# Patient Record
Sex: FEMALE | Race: ASIAN | NOT HISPANIC OR LATINO
[De-identification: names, ages, dates, MRNs, and addresses within clinical notes are randomized per-mention and may not be internally consistent; named-entity substitution may affect disease eponyms.]

---

## 2020-08-19 ENCOUNTER — TRANSCRIPTION ENCOUNTER (OUTPATIENT)
Age: 30
End: 2020-08-19

## 2020-08-19 ENCOUNTER — ASOB RESULT (OUTPATIENT)
Age: 30
End: 2020-08-19

## 2020-08-19 ENCOUNTER — APPOINTMENT (OUTPATIENT)
Dept: ANTEPARTUM | Facility: CLINIC | Age: 30
End: 2020-08-19
Payer: COMMERCIAL

## 2020-08-19 PROBLEM — Z00.00 ENCOUNTER FOR PREVENTIVE HEALTH EXAMINATION: Status: ACTIVE | Noted: 2020-08-19

## 2020-08-19 PROCEDURE — 76801 OB US < 14 WKS SINGLE FETUS: CPT

## 2020-08-19 PROCEDURE — 76813 OB US NUCHAL MEAS 1 GEST: CPT

## 2020-08-19 PROCEDURE — 36416 COLLJ CAPILLARY BLOOD SPEC: CPT

## 2020-10-14 ENCOUNTER — APPOINTMENT (OUTPATIENT)
Dept: ANTEPARTUM | Facility: CLINIC | Age: 30
End: 2020-10-14
Payer: COMMERCIAL

## 2020-10-14 ENCOUNTER — ASOB RESULT (OUTPATIENT)
Age: 30
End: 2020-10-14

## 2020-10-14 PROCEDURE — 76811 OB US DETAILED SNGL FETUS: CPT

## 2020-10-19 ENCOUNTER — APPOINTMENT (OUTPATIENT)
Dept: ANTEPARTUM | Facility: CLINIC | Age: 30
End: 2020-10-19

## 2021-01-27 ENCOUNTER — APPOINTMENT (OUTPATIENT)
Dept: ANTEPARTUM | Facility: CLINIC | Age: 31
End: 2021-01-27
Payer: COMMERCIAL

## 2021-01-27 ENCOUNTER — ASOB RESULT (OUTPATIENT)
Age: 31
End: 2021-01-27

## 2021-01-27 PROCEDURE — 99072 ADDL SUPL MATRL&STAF TM PHE: CPT

## 2021-01-27 PROCEDURE — 76816 OB US FOLLOW-UP PER FETUS: CPT

## 2021-01-27 PROCEDURE — 76819 FETAL BIOPHYS PROFIL W/O NST: CPT

## 2021-02-19 ENCOUNTER — APPOINTMENT (OUTPATIENT)
Dept: ANTEPARTUM | Facility: CLINIC | Age: 31
End: 2021-02-19
Payer: COMMERCIAL

## 2021-02-19 ENCOUNTER — ASOB RESULT (OUTPATIENT)
Age: 31
End: 2021-02-19

## 2021-02-19 PROCEDURE — 99072 ADDL SUPL MATRL&STAF TM PHE: CPT

## 2021-02-19 PROCEDURE — 76816 OB US FOLLOW-UP PER FETUS: CPT

## 2021-03-02 ENCOUNTER — ASOB RESULT (OUTPATIENT)
Age: 31
End: 2021-03-02

## 2021-03-02 ENCOUNTER — APPOINTMENT (OUTPATIENT)
Dept: ANTEPARTUM | Facility: CLINIC | Age: 31
End: 2021-03-02
Payer: COMMERCIAL

## 2021-03-02 PROCEDURE — 99072 ADDL SUPL MATRL&STAF TM PHE: CPT

## 2021-03-02 PROCEDURE — 76819 FETAL BIOPHYS PROFIL W/O NST: CPT

## 2021-03-04 ENCOUNTER — OUTPATIENT (OUTPATIENT)
Dept: OUTPATIENT SERVICES | Facility: HOSPITAL | Age: 31
LOS: 1 days | End: 2021-03-04
Payer: COMMERCIAL

## 2021-03-04 DIAGNOSIS — Z3A.00 WEEKS OF GESTATION OF PREGNANCY NOT SPECIFIED: ICD-10-CM

## 2021-03-04 DIAGNOSIS — O26.899 OTHER SPECIFIED PREGNANCY RELATED CONDITIONS, UNSPECIFIED TRIMESTER: ICD-10-CM

## 2021-03-04 PROCEDURE — 99214 OFFICE O/P EST MOD 30 MIN: CPT | Mod: 25

## 2021-03-04 PROCEDURE — 76818 FETAL BIOPHYS PROFILE W/NST: CPT | Mod: 26

## 2021-03-04 PROCEDURE — 59025 FETAL NON-STRESS TEST: CPT | Mod: 26

## 2021-03-05 ENCOUNTER — INPATIENT (INPATIENT)
Facility: HOSPITAL | Age: 31
LOS: 1 days | Discharge: HOME | End: 2021-03-07
Attending: STUDENT IN AN ORGANIZED HEALTH CARE EDUCATION/TRAINING PROGRAM | Admitting: STUDENT IN AN ORGANIZED HEALTH CARE EDUCATION/TRAINING PROGRAM
Payer: COMMERCIAL

## 2021-03-05 VITALS
OXYGEN SATURATION: 99 % | SYSTOLIC BLOOD PRESSURE: 124 MMHG | TEMPERATURE: 98 F | HEART RATE: 99 BPM | DIASTOLIC BLOOD PRESSURE: 82 MMHG | RESPIRATION RATE: 18 BRPM

## 2021-03-05 VITALS — SYSTOLIC BLOOD PRESSURE: 124 MMHG | DIASTOLIC BLOOD PRESSURE: 82 MMHG

## 2021-03-05 DIAGNOSIS — O26.899 OTHER SPECIFIED PREGNANCY RELATED CONDITIONS, UNSPECIFIED TRIMESTER: ICD-10-CM

## 2021-03-05 DIAGNOSIS — Z34.80 ENCOUNTER FOR SUPERVISION OF OTHER NORMAL PREGNANCY, UNSPECIFIED TRIMESTER: ICD-10-CM

## 2021-03-05 DIAGNOSIS — Z3A.00 WEEKS OF GESTATION OF PREGNANCY NOT SPECIFIED: ICD-10-CM

## 2021-03-05 LAB
BASOPHILS # BLD AUTO: 0.01 K/UL — SIGNIFICANT CHANGE UP (ref 0–0.2)
BASOPHILS NFR BLD AUTO: 0.1 % — SIGNIFICANT CHANGE UP (ref 0–2)
BLD GP AB SCN SERPL QL: NEGATIVE — SIGNIFICANT CHANGE UP
EOSINOPHIL # BLD AUTO: 0 K/UL — SIGNIFICANT CHANGE UP (ref 0–0.5)
EOSINOPHIL NFR BLD AUTO: 0 % — SIGNIFICANT CHANGE UP (ref 0–6)
HCT VFR BLD CALC: 36.2 % — SIGNIFICANT CHANGE UP (ref 34.5–45)
HGB BLD-MCNC: 12.3 G/DL — SIGNIFICANT CHANGE UP (ref 11.5–15.5)
IMM GRANULOCYTES NFR BLD AUTO: 0.5 % — SIGNIFICANT CHANGE UP (ref 0–1.5)
LYMPHOCYTES # BLD AUTO: 1.14 K/UL — SIGNIFICANT CHANGE UP (ref 1–3.3)
LYMPHOCYTES # BLD AUTO: 9.5 % — LOW (ref 13–44)
MCHC RBC-ENTMCNC: 30.6 PG — SIGNIFICANT CHANGE UP (ref 27–34)
MCHC RBC-ENTMCNC: 34 GM/DL — SIGNIFICANT CHANGE UP (ref 32–36)
MCV RBC AUTO: 90 FL — SIGNIFICANT CHANGE UP (ref 80–100)
MONOCYTES # BLD AUTO: 0.57 K/UL — SIGNIFICANT CHANGE UP (ref 0–0.9)
MONOCYTES NFR BLD AUTO: 4.8 % — SIGNIFICANT CHANGE UP (ref 2–14)
NEUTROPHILS # BLD AUTO: 10.19 K/UL — HIGH (ref 1.8–7.4)
NEUTROPHILS NFR BLD AUTO: 85.1 % — HIGH (ref 43–77)
NRBC # BLD: 0 /100 WBCS — SIGNIFICANT CHANGE UP (ref 0–0)
PLATELET # BLD AUTO: 170 K/UL — SIGNIFICANT CHANGE UP (ref 150–400)
RBC # BLD: 4.02 M/UL — SIGNIFICANT CHANGE UP (ref 3.8–5.2)
RBC # FLD: 13.5 % — SIGNIFICANT CHANGE UP (ref 10.3–14.5)
RH IG SCN BLD-IMP: POSITIVE — SIGNIFICANT CHANGE UP
SARS-COV-2 IGG SERPL QL IA: NEGATIVE — SIGNIFICANT CHANGE UP
SARS-COV-2 IGM SERPL IA-ACNC: 0.07 INDEX — SIGNIFICANT CHANGE UP
SARS-COV-2 RNA SPEC QL NAA+PROBE: SIGNIFICANT CHANGE UP
WBC # BLD: 11.97 K/UL — HIGH (ref 3.8–10.5)
WBC # FLD AUTO: 11.97 K/UL — HIGH (ref 3.8–10.5)

## 2021-03-05 PROCEDURE — 59025 FETAL NON-STRESS TEST: CPT

## 2021-03-05 PROCEDURE — G0463: CPT

## 2021-03-05 RX ORDER — DIBUCAINE 1 %
1 OINTMENT (GRAM) RECTAL EVERY 6 HOURS
Refills: 0 | Status: DISCONTINUED | OUTPATIENT
Start: 2021-03-05 | End: 2021-03-07

## 2021-03-05 RX ORDER — CITRIC ACID/SODIUM CITRATE 300-500 MG
15 SOLUTION, ORAL ORAL EVERY 6 HOURS
Refills: 0 | Status: DISCONTINUED | OUTPATIENT
Start: 2021-03-05 | End: 2021-03-05

## 2021-03-05 RX ORDER — HYDROCORTISONE 1 %
1 OINTMENT (GRAM) TOPICAL EVERY 6 HOURS
Refills: 0 | Status: DISCONTINUED | OUTPATIENT
Start: 2021-03-05 | End: 2021-03-07

## 2021-03-05 RX ORDER — PRAMOXINE HYDROCHLORIDE 150 MG/15G
1 AEROSOL, FOAM RECTAL EVERY 4 HOURS
Refills: 0 | Status: DISCONTINUED | OUTPATIENT
Start: 2021-03-05 | End: 2021-03-07

## 2021-03-05 RX ORDER — TETANUS TOXOID, REDUCED DIPHTHERIA TOXOID AND ACELLULAR PERTUSSIS VACCINE, ADSORBED 5; 2.5; 8; 8; 2.5 [IU]/.5ML; [IU]/.5ML; UG/.5ML; UG/.5ML; UG/.5ML
0.5 SUSPENSION INTRAMUSCULAR ONCE
Refills: 0 | Status: DISCONTINUED | OUTPATIENT
Start: 2021-03-05 | End: 2021-03-07

## 2021-03-05 RX ORDER — OXYTOCIN 10 UNIT/ML
4 VIAL (ML) INJECTION
Qty: 30 | Refills: 0 | Status: DISCONTINUED | OUTPATIENT
Start: 2021-03-05 | End: 2021-03-05

## 2021-03-05 RX ORDER — ACETAMINOPHEN 500 MG
975 TABLET ORAL
Refills: 0 | Status: DISCONTINUED | OUTPATIENT
Start: 2021-03-05 | End: 2021-03-07

## 2021-03-05 RX ORDER — MAGNESIUM HYDROXIDE 400 MG/1
30 TABLET, CHEWABLE ORAL
Refills: 0 | Status: DISCONTINUED | OUTPATIENT
Start: 2021-03-05 | End: 2021-03-07

## 2021-03-05 RX ORDER — BENZOCAINE 10 %
1 GEL (GRAM) MUCOUS MEMBRANE EVERY 6 HOURS
Refills: 0 | Status: DISCONTINUED | OUTPATIENT
Start: 2021-03-05 | End: 2021-03-07

## 2021-03-05 RX ORDER — IBUPROFEN 200 MG
600 TABLET ORAL EVERY 6 HOURS
Refills: 0 | Status: COMPLETED | OUTPATIENT
Start: 2021-03-05 | End: 2022-02-01

## 2021-03-05 RX ORDER — LANOLIN
1 OINTMENT (GRAM) TOPICAL EVERY 6 HOURS
Refills: 0 | Status: DISCONTINUED | OUTPATIENT
Start: 2021-03-05 | End: 2021-03-07

## 2021-03-05 RX ORDER — OXYCODONE HYDROCHLORIDE 5 MG/1
5 TABLET ORAL ONCE
Refills: 0 | Status: DISCONTINUED | OUTPATIENT
Start: 2021-03-05 | End: 2021-03-07

## 2021-03-05 RX ORDER — KETOROLAC TROMETHAMINE 30 MG/ML
30 SYRINGE (ML) INJECTION ONCE
Refills: 0 | Status: DISCONTINUED | OUTPATIENT
Start: 2021-03-05 | End: 2021-03-05

## 2021-03-05 RX ORDER — AER TRAVELER 0.5 G/1
1 SOLUTION RECTAL; TOPICAL EVERY 4 HOURS
Refills: 0 | Status: DISCONTINUED | OUTPATIENT
Start: 2021-03-05 | End: 2021-03-07

## 2021-03-05 RX ORDER — OXYTOCIN 10 UNIT/ML
333.33 VIAL (ML) INJECTION
Qty: 20 | Refills: 0 | Status: DISCONTINUED | OUTPATIENT
Start: 2021-03-05 | End: 2021-03-07

## 2021-03-05 RX ORDER — SODIUM CHLORIDE 9 MG/ML
1000 INJECTION, SOLUTION INTRAVENOUS
Refills: 0 | Status: DISCONTINUED | OUTPATIENT
Start: 2021-03-05 | End: 2021-03-05

## 2021-03-05 RX ORDER — DIPHENHYDRAMINE HCL 50 MG
25 CAPSULE ORAL EVERY 6 HOURS
Refills: 0 | Status: DISCONTINUED | OUTPATIENT
Start: 2021-03-05 | End: 2021-03-07

## 2021-03-05 RX ORDER — SODIUM CHLORIDE 9 MG/ML
3 INJECTION INTRAMUSCULAR; INTRAVENOUS; SUBCUTANEOUS EVERY 8 HOURS
Refills: 0 | Status: DISCONTINUED | OUTPATIENT
Start: 2021-03-05 | End: 2021-03-07

## 2021-03-05 RX ORDER — OXYCODONE HYDROCHLORIDE 5 MG/1
5 TABLET ORAL
Refills: 0 | Status: DISCONTINUED | OUTPATIENT
Start: 2021-03-05 | End: 2021-03-07

## 2021-03-05 RX ORDER — SIMETHICONE 80 MG/1
80 TABLET, CHEWABLE ORAL EVERY 4 HOURS
Refills: 0 | Status: DISCONTINUED | OUTPATIENT
Start: 2021-03-05 | End: 2021-03-07

## 2021-03-05 RX ADMIN — Medication 1000 MILLIUNIT(S)/MIN: at 23:09

## 2021-03-05 RX ADMIN — Medication 1000 MILLIUNIT(S)/MIN: at 11:07

## 2021-03-05 RX ADMIN — Medication 30 MILLIGRAM(S): at 23:52

## 2021-03-05 NOTE — OB PROVIDER LABOR PROGRESS NOTE - ASSESSMENT
continue pitocin for augmentation  analgesia prn  continue peanut ball  expectant reta Ballard
continue pitocin  analgesia prn   expectant reta Ballard
continue pitocin for augmentation of labor  analgesia prn  place peanut ball  expectant reta Ballard

## 2021-03-05 NOTE — OB PROVIDER H&P - NSHPPHYSICALEXAM_GEN_ALL_CORE
T(C): 37.2 (03-05-21 @ 09:29), Max: 37.2 (03-05-21 @ 01:23)  HR: 77 (03-05-21 @ 09:58) (59 - 101)  BP: 119/56 (03-05-21 @ 09:29) (116/69 - 124/82)  RR: 16 (03-05-21 @ 09:29) (16 - 18)  SpO2: 97% (03-05-21 @ 09:58) (97% - 99%)  GEN:NAD  CV:RRR  Pulm: CTA bl  Abd soft NT gravid  FHT:  140   , mod julieth, + accels, - decels   TOCO:  q7m  VE: 2.5/80/-2  SONO  SONO

## 2021-03-05 NOTE — OB PROVIDER H&P - ATTENDING COMMENTS
I personally saw and evaluated pt and agree with HOLA Carney's assessment and plan.  Will admit pt for augmentation of labor  analgesia prn

## 2021-03-05 NOTE — OB PROVIDER TRIAGE NOTE - NSHPPHYSICALEXAM_GEN_ALL_CORE
Vital Signs Last 24 Hrs  T(C): 37.2 (05 Mar 2021 01:23), Max: 37.2 (05 Mar 2021 01:23)  T(F): 98.96 (05 Mar 2021 01:23), Max: 98.96 (05 Mar 2021 01:23)  HR: 68 (05 Mar 2021 01:24) (59 - 99)  BP: 116/69 (05 Mar 2021 01:22) (116/69 - 124/82)  BP(mean): --  RR: 18 (05 Mar 2021 00:46) (18 - 18)  SpO2: 98% (05 Mar 2021 01:24) (98% - 99%)    General: A&Ox3  CV: RRR  Lungs: CTA b/l  Abdomen: Soft, NT, gravid

## 2021-03-05 NOTE — OB PROVIDER TRIAGE NOTE - HISTORY OF PRESENT ILLNESS
30y  @40wks and 3 days gestation presenting with contractions. Patient states the contractions started @730pm, are q2-5m and rated as a 3/10. She denies LOF or VB. PNC uncomplicated. +FM. GBS -. EFW 6#12 done by ultrasound @38wks gestation.    POBHx: Denies  PGYNhx: Denies fibroids, ovarian cysts, abnormal pap smears, STD's  PMHx: Denies  Medications: PNV  Allergies: NKDA  PSHx: Denies  Social Hx: Denies etoh/tobacco/drug use. Denies anxiety/depression    Vital Signs Last 24 Hrs  T(C): 37.2 (05 Mar 2021 01:23), Max: 37.2 (05 Mar 2021 01:23)  T(F): 98.96 (05 Mar 2021 01:23), Max: 98.96 (05 Mar 2021 01:23)  HR: 68 (05 Mar 2021 01:24) (59 - 99)  BP: 116/69 (05 Mar 2021 01:22) (116/69 - 124/82)  BP(mean): --  RR: 18 (05 Mar 2021 00:46) (18 - 18)  SpO2: 98% (05 Mar 2021 01:24) (98% - 99%)    General: A&Ox3  CV: RRR  Lungs: CTA b/l  Abdomen: Soft, NT, gravid    VE: 2/70/-3  EFM: 125/moderate variability/+accels/no decels-reactive  Cushman: q4-5m  BPP: 8/8, vertex, MAGI 12

## 2021-03-05 NOTE — OB RN DELIVERY SUMMARY - NS_SEPSISRSKCALC_OBGYN_ALL_OB_FT
EOS calculated successfully. EOS Risk Factor: 0.5/1000 live births ([facility] national incidence); GA=40w3d; Temp=99.14; ROM=1.433; GBS='Negative'; Antibiotics='No antibiotics or any antibiotics < 2 hrs prior to birth'

## 2021-03-05 NOTE — OB NEONATOLOGY/PEDIATRICIAN DELIVERY SUMMARY - NSPEDSNEONOTESA_OBGYN_ALL_OB_FT
Peds called to LDR 3 for meconium. 40.2wk male born via  to a 31 y/o  blood type B+ mother. No significant maternal or prenatal history. PNL -/-/NR/I, GBS - on 2/3. AROM at 20:25 (26 hours prior to delivery) with meconium fluids. Baby emerged vigorous, crying, was w/d/s/s with APGARS of 9/9. Mom plans to initiate breastfeeding, consents Hep B vaccine and consents circ. Highest maternal temp 37.3C. EOS 0.34. Peds called to LDR 3 for meconium. 40.2wk male born via  to a 30 y/o  blood type B+ mother. No significant maternal or prenatal history. PNL -/-/NR/I, GBS - on 2/3. AROM at 20:25 (1.5 hour prior to delivery) with meconium fluids. Baby emerged vigorous, crying, was w/d/s/s with APGARS of 9/9. Mom plans to initiate breastfeeding, consents Hep B vaccine and consents circ. Highest maternal temp 37.3C. EOS 0.13.

## 2021-03-05 NOTE — OB PROVIDER TRIAGE NOTE - NSOBPROVIDERNOTE_OBGYN_ALL_OB_FT
A/P:  30y  @40wks and 3 days gestation presenting with contractions, found to be in early labor. +FM. GBS -  -Patient to be d/c home  -Patient to return if she experiences worsening ctx's, LOF, VB or decreased FM  D/w Dr. Govea who also saw and evaluated patient at bedside  Kelle Couch PA-C

## 2021-03-05 NOTE — OB PROVIDER H&P - ASSESSMENT
29yo   @ 40w3d in early labor, GBS negative  admit  efm/toco  ivf  routine labs/Covid swab  Augment labor with Pitocin  anticipate   anesthesia consult  D/W Dr Margo Carney PAC

## 2021-03-05 NOTE — OB PROVIDER H&P - HISTORY OF PRESENT ILLNESS
30y  @40wks and 3 days gestation presenting with contractions. Patient states the contractions started @730pm,seen in triage at 130am and sent home in prodromal labor. Pt returns with increase in ctx pain. She denies LOF or VB. PNC uncomplicated. +FM. GBS -. EFW 6#12 done by ultrasound @38wks gestation.    POBHx: Denies  PGYNhx: Denies fibroids, ovarian cysts, abnormal pap smears, STD's  PMHx: Denies  Medications: PNV  Allergies: NKDA  PSHx: Denies  Social Hx: Denies etoh/tobacco/drug use. Denies anxiety/depression

## 2021-03-05 NOTE — OB PROVIDER DELIVERY SUMMARY - NSPROVIDERDELIVERYNOTE_OBGYN_ALL_OB_FT
Spontaneous vaginal delivery of liveborn infant from OA position. Head, shoulders, and body delivered through loose nuchal x1 easily. Infant was suctioned. Light mec. Delayed cord clamping and infant was passed to mother. Cord clamped and cut. Placenta delivered intact with a 3-vessel cord. Fundal massage was given and uterine fundus was found to be firm. Vaginal exam revealed an intact cervix, vaginal walls and sulci. Patient had a 2nd degree laceration in the perineum that was repaired with 2.0 vicryl suture, and skin repaired with 3.0 vicryl suture. Patient also had a left sulcal laceration that was repaired with 2.0 vicryl suture. Excellent hemostasis was noted. Patient was stable and went to recovery. Count was correct x 2.    Nini Thayer PGY1

## 2021-03-06 ENCOUNTER — TRANSCRIPTION ENCOUNTER (OUTPATIENT)
Age: 31
End: 2021-03-06

## 2021-03-06 LAB
HCT VFR BLD CALC: 31.7 % — LOW (ref 34.5–45)
HGB BLD-MCNC: 10.4 G/DL — LOW (ref 11.5–15.5)
T PALLIDUM AB TITR SER: NEGATIVE — SIGNIFICANT CHANGE UP

## 2021-03-06 RX ORDER — IBUPROFEN 200 MG
1 TABLET ORAL
Qty: 0 | Refills: 0 | DISCHARGE
Start: 2021-03-06

## 2021-03-06 RX ORDER — PRAMOXINE HYDROCHLORIDE 150 MG/15G
1 AEROSOL, FOAM RECTAL
Qty: 0 | Refills: 0 | DISCHARGE
Start: 2021-03-06

## 2021-03-06 RX ORDER — ACETAMINOPHEN 500 MG
3 TABLET ORAL
Qty: 0 | Refills: 0 | DISCHARGE
Start: 2021-03-06

## 2021-03-06 RX ORDER — AER TRAVELER 0.5 G/1
1 SOLUTION RECTAL; TOPICAL
Qty: 0 | Refills: 0 | DISCHARGE
Start: 2021-03-06

## 2021-03-06 RX ORDER — IBUPROFEN 200 MG
600 TABLET ORAL EVERY 6 HOURS
Refills: 0 | Status: DISCONTINUED | OUTPATIENT
Start: 2021-03-06 | End: 2021-03-07

## 2021-03-06 RX ORDER — LANOLIN
1 OINTMENT (GRAM) TOPICAL
Qty: 0 | Refills: 0 | DISCHARGE
Start: 2021-03-06

## 2021-03-06 RX ADMIN — Medication 975 MILLIGRAM(S): at 09:30

## 2021-03-06 RX ADMIN — Medication 975 MILLIGRAM(S): at 08:49

## 2021-03-06 RX ADMIN — Medication 1 TABLET(S): at 11:39

## 2021-03-06 RX ADMIN — Medication 975 MILLIGRAM(S): at 14:57

## 2021-03-06 RX ADMIN — Medication 975 MILLIGRAM(S): at 20:38

## 2021-03-06 RX ADMIN — Medication 600 MILLIGRAM(S): at 12:15

## 2021-03-06 RX ADMIN — Medication 600 MILLIGRAM(S): at 06:57

## 2021-03-06 RX ADMIN — Medication 975 MILLIGRAM(S): at 21:10

## 2021-03-06 RX ADMIN — Medication 600 MILLIGRAM(S): at 11:39

## 2021-03-06 RX ADMIN — Medication 600 MILLIGRAM(S): at 17:53

## 2021-03-06 RX ADMIN — Medication 600 MILLIGRAM(S): at 06:27

## 2021-03-06 RX ADMIN — Medication 975 MILLIGRAM(S): at 02:41

## 2021-03-06 RX ADMIN — Medication 975 MILLIGRAM(S): at 15:30

## 2021-03-06 NOTE — DISCHARGE NOTE OB - MEDICATION SUMMARY - MEDICATIONS TO TAKE
I will START or STAY ON the medications listed below when I get home from the hospital:    acetaminophen 325 mg oral tablet  -- 3 tab(s) by mouth   -- Indication: For Vaginal delivery    ibuprofen 600 mg oral tablet  -- 1 tab(s) by mouth every 6 hours  -- Indication: For Vaginal delivery    lanolin topical ointment  -- 1 application on skin every 6 hours, As needed, nipple soreness  -- Indication: For breast soreness    pramoxine 1% topical cream  -- 1 application on skin every 4 hours, As needed, Moderate Pain (4-6)  -- Indication: For Vaginal delivery    witch hazel 50% topical pad  -- 1 application on skin every 4 hours, As needed, Perineal discomfort  -- Indication: For Vaginal delivery    Prenatal Multivitamins oral tablet  -- Indication: For Vaginal delivery

## 2021-03-06 NOTE — PROGRESS NOTE ADULT - ASSESSMENT
A/P:  30y  PPD # 1      S/P                     doing well      Current Issues: Urinary retention - ma reinserted at 5pm (3/5), 1600cc drained, ma to stay in for 8 hours  PAST MEDICAL & SURGICAL HISTORY:  No pertinent past medical history    No significant past surgical history A/P:  30y  PPD # 1      S/P                     doing well      Current Issues: Urinary retention - ma reinserted at 5am (3/6), 1600cc drained, ma to stay in for 8 hours  PAST MEDICAL & SURGICAL HISTORY:  No pertinent past medical history    No significant past surgical history

## 2021-03-06 NOTE — PROGRESS NOTE ADULT - ATTENDING COMMENTS
Patient doing well s/p vaginal delivery. She did have urinary retention which required placement of ma. She wishes to stay until PPD #2.

## 2021-03-06 NOTE — DISCHARGE NOTE OB - CARE PLAN
Principal Discharge DX:	Vaginal delivery  Goal:	return to baseline function  Assessment and plan of treatment:	regular diet, activity as tolerated, follow up as outpatient

## 2021-03-06 NOTE — DISCHARGE NOTE OB - CARE PROVIDER_API CALL
Marimar Chris)  Obstetrics and Gynecology  [address] [place] [address]  Phone: [phone]  Fax: [phone]  Follow Up Time:

## 2021-03-06 NOTE — DISCHARGE NOTE OB - HOSPITAL COURSE
Pt underwent an  without any complications. Her postpartum course was complicated by urinary retention which resolved with bladder rest. She met all her milestsones in regards to her vitals, postpartum labs, diet, ambulation, pain management. Follow up discussed. Pt was discharged home on PPD#2.

## 2021-03-06 NOTE — DISCHARGE NOTE OB - PATIENT PORTAL LINK FT
You can access the FollowMyHealth Patient Portal offered by [facility] by registering at the following website: [email or web address]. By joining [employer]’s FollowMyHealth portal, you will also be able to view your health information using other applications (apps) compatible with our system.

## 2021-03-07 VITALS
DIASTOLIC BLOOD PRESSURE: 61 MMHG | SYSTOLIC BLOOD PRESSURE: 103 MMHG | OXYGEN SATURATION: 100 % | HEART RATE: 53 BPM | RESPIRATION RATE: 18 BRPM | TEMPERATURE: 97 F

## 2021-03-07 PROCEDURE — 86900 BLOOD TYPING SEROLOGIC ABO: CPT

## 2021-03-07 PROCEDURE — 86850 RBC ANTIBODY SCREEN: CPT

## 2021-03-07 PROCEDURE — G0463: CPT

## 2021-03-07 PROCEDURE — 86901 BLOOD TYPING SEROLOGIC RH(D): CPT

## 2021-03-07 PROCEDURE — 85018 HEMOGLOBIN: CPT

## 2021-03-07 PROCEDURE — 59050 FETAL MONITOR W/REPORT: CPT

## 2021-03-07 PROCEDURE — U0005: CPT

## 2021-03-07 PROCEDURE — 85014 HEMATOCRIT: CPT

## 2021-03-07 PROCEDURE — 86780 TREPONEMA PALLIDUM: CPT

## 2021-03-07 PROCEDURE — 86769 SARS-COV-2 COVID-19 ANTIBODY: CPT

## 2021-03-07 PROCEDURE — 87635 SARS-COV-2 COVID-19 AMP PRB: CPT

## 2021-03-07 PROCEDURE — 85025 COMPLETE CBC W/AUTO DIFF WBC: CPT

## 2021-03-07 PROCEDURE — 59025 FETAL NON-STRESS TEST: CPT

## 2021-03-07 PROCEDURE — 90707 MMR VACCINE SC: CPT

## 2021-03-07 RX ADMIN — Medication 0.5 MILLILITER(S): at 12:19

## 2021-03-07 RX ADMIN — Medication 975 MILLIGRAM(S): at 12:19

## 2021-03-07 RX ADMIN — Medication 600 MILLIGRAM(S): at 01:00

## 2021-03-07 RX ADMIN — Medication 975 MILLIGRAM(S): at 13:00

## 2021-03-07 RX ADMIN — Medication 600 MILLIGRAM(S): at 00:29

## 2021-03-07 RX ADMIN — Medication 975 MILLIGRAM(S): at 06:29

## 2021-03-07 RX ADMIN — Medication 975 MILLIGRAM(S): at 07:30

## 2021-03-07 NOTE — PROGRESS NOTE ADULT - SUBJECTIVE AND OBJECTIVE BOX
Postpartum Note- PPD#1    Allergies    No Known Allergies    Intolerances    Blood Type B   Positive    RPR : Negative    Rubella: Immune    S:Patient is a  30y           PPD#1         S/P     Patient w/o complaints, pain is controlled.    Pt is OOB, tolerating PO, passing flatus. Lochia WNL.     Feeding: Breast    O:  Vital Signs Last 24 Hrs  T(C): 36.3 (06 Mar 2021 05:35), Max: 37.5 (05 Mar 2021 22:30)  T(F): 97.4 (06 Mar 2021 05:35), Max: 99.5 (05 Mar 2021 22:30)  HR: 61 (06 Mar 2021 05:35) (50 - 126)  BP: 98/60 (06 Mar 2021 05:35) (88/54 - 193/99)  BP(mean): 84 (06 Mar 2021 00:30) (69 - 84)  RR: 18 (06 Mar 2021 05:35) (16 - 19)  SpO2: 98% (06 Mar 2021 05:35) (82% - 100%)     Gen: NAD  Abdomen: Soft, nontender, non-distended, fundus firm.  Vaginal: Lochia WNL  Ext: Neg calf tenderness    LABS:    Hemoglobin: 10.4 g/dL ( @ 07:28)  Hemoglobin: 12.3 g/dL ( @ 10:35)      Hematocrit: 31.7 % ( @ 07:28)  Hematocrit: 36.2 % ( @ 10:35)                
Patient feeling well, able to void. Pain well managed.  Vital Signs Last 24 Hrs  T(C): 36.3 (07 Mar 2021 05:41), Max: 36.6 (06 Mar 2021 17:00)  T(F): 97.4 (07 Mar 2021 05:41), Max: 97.9 (06 Mar 2021 17:00)  HR: 53 (07 Mar 2021 05:41) (53 - 69)  BP: 103/61 (07 Mar 2021 05:41) (102/68 - 106/71)  BP(mean): --  RR: 18 (07 Mar 2021 05:41) (18 - 18)  SpO2: 100% (07 Mar 2021 05:41) (98% - 100%)    chest good air entry  abd soft nontender  fundus firm  lochia mild  ext normal

## 2021-03-09 ENCOUNTER — NON-APPOINTMENT (OUTPATIENT)
Age: 31
End: 2021-03-09

## 2021-03-10 ENCOUNTER — TRANSCRIPTION ENCOUNTER (OUTPATIENT)
Age: 31
End: 2021-03-10

## 2021-03-16 ENCOUNTER — TRANSCRIPTION ENCOUNTER (OUTPATIENT)
Age: 31
End: 2021-03-16

## 2022-03-02 ENCOUNTER — EMERGENCY (EMERGENCY)
Facility: HOSPITAL | Age: 32
LOS: 1 days | Discharge: HOME | End: 2022-03-02
Attending: STUDENT IN AN ORGANIZED HEALTH CARE EDUCATION/TRAINING PROGRAM | Admitting: STUDENT IN AN ORGANIZED HEALTH CARE EDUCATION/TRAINING PROGRAM
Payer: COMMERCIAL

## 2022-03-02 VITALS
HEART RATE: 80 BPM | DIASTOLIC BLOOD PRESSURE: 77 MMHG | HEIGHT: 62 IN | RESPIRATION RATE: 16 BRPM | OXYGEN SATURATION: 100 % | TEMPERATURE: 100 F | SYSTOLIC BLOOD PRESSURE: 128 MMHG

## 2022-03-02 PROCEDURE — 99284 EMERGENCY DEPT VISIT MOD MDM: CPT

## 2022-03-02 NOTE — ED PROVIDER NOTE - PHYSICAL EXAMINATION
Gen: no acute distress, well appearing, awake, alert and oriented x 3  Head: normocephalic, +left facial erythema and mild swelling noted preauricuar, no skin breaks  Eyes: pupils equal round and reactive to light and accomodation, extraocular mucles intact, normal conjunctiva, no periorbital swelling  Ears, Nose Throat: ruptured L tympanic membrane, normal turbinates, no septal hematoma, oropharynx nonerythematous, uvula midline, no tonsillar swelling or exudates, moist mucosa, neck supple with FROM, no lymphadenopathy, no masses, no JVD   Cardiac: regular rate and rhythm, +S1S2  Pulm: Clear to auscultation bilaterally  Abd: soft, nontender, nondistended, no guarding

## 2022-03-02 NOTE — ED PROVIDER NOTE - PROGRESS NOTE DETAILS
Spoke with ENT who recommends outpt follow up without any abx. Recommendation to prevent any liquid entering ear canal for traumatic rupture of TM
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
## 2022-03-02 NOTE — ED PROVIDER NOTE - PATIENT PORTAL LINK FT
You can access the FollowMyHealth Patient Portal offered by [facility] by registering at the following website: [email or web address]. By joining [employer]’s FollowMyHealth portal, you will also be able to view your health information using other applications (apps) compatible with our system.

## 2022-03-02 NOTE — ED PROVIDER NOTE - CARE PROVIDER_API CALL
Toro Arguelles)  Otolaryngology  [address] [facility], [address] [place] [address]  Phone: [phone]  Fax: [phone]  Follow Up Time: 1-3 Days
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
## 2022-03-02 NOTE — ED PROVIDER NOTE - NSFOLLOWUPINSTRUCTIONS_ED_ALL_ED_FT
Please return to Emergency Department immediately for any new, concerning, or worsening symptoms.   Please follow-up with ENT as recommended.

## 2022-03-03 PROBLEM — Z78.9 OTHER SPECIFIED HEALTH STATUS: Chronic | Status: ACTIVE | Noted: 2021-03-05

## 2022-04-21 ENCOUNTER — APPOINTMENT (OUTPATIENT)
Dept: OTOLARYNGOLOGY | Facility: CLINIC | Age: 32
End: 2022-04-21
Payer: COMMERCIAL

## 2022-04-21 VITALS
BODY MASS INDEX: 19.66 KG/M2 | WEIGHT: 118 LBS | DIASTOLIC BLOOD PRESSURE: 74 MMHG | HEART RATE: 96 BPM | SYSTOLIC BLOOD PRESSURE: 121 MMHG | HEIGHT: 65 IN

## 2022-04-21 DIAGNOSIS — Z78.9 OTHER SPECIFIED HEALTH STATUS: ICD-10-CM

## 2022-04-21 PROCEDURE — 92567 TYMPANOMETRY: CPT

## 2022-04-21 PROCEDURE — 99213 OFFICE O/P EST LOW 20 MIN: CPT

## 2022-04-21 PROCEDURE — 92557 COMPREHENSIVE HEARING TEST: CPT

## 2022-04-21 PROCEDURE — 92504 EAR MICROSCOPY EXAMINATION: CPT

## 2022-04-22 NOTE — DATA REVIEWED
[de-identified] : An audiogram was ordered and performed including tympanometry, pure tones and speech, for patient's complaint of L TM perf\par I have independently reviewed the patient's audiogram from today and my findings include L CHL
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
## 2022-04-22 NOTE — PHYSICAL EXAM
[Binocular Microscopic Exam] : Binocular microscopic exam was performed [Rinne Test Air Conduction Persists > Bone Conduction Right] : air conduction greater than bone conduction on the right [Rinne Test Air Conduction Persists > Bone Conduction Left] : air conduction greater than bone conduction on the left [Hearing Moreno Test (Tuning Fork On Forehead)] : no lateralization of tone [Midline] : trachea located in midline position [Normal] : no rashes [Hearing Loss Right Only] : normal [Hearing Loss Left Only] : normal [Nystagmus] : ~T no ~M nystagmus was seen [Fukuda Step Test] : Fukuda Step Test was Negative [Romberg's Sign] : Romberg's sign was absent [Fistula Sign] : Fistula Sign: Negative [Past-Pointing] : Past-Pointing: Negative [Raúl-Hallaptyke] : [place]-Hallpike: Negative [de-identified] : inferior 40% perf, dry

## 2022-04-22 NOTE — PROCEDURE
[] : Binocular Microscopy [FreeTextEntry6] : Operative microscope was used to examine the ear canal, ear drum, and visible middle ear landmarks. Adequate exam would not have been possible without the use of a microscope. Findings are described.

## 2022-04-22 NOTE — REASON FOR VISIT
[Initial Consultation] : an initial consultation for [FreeTextEntry2] : hearing loss [Interpreters_IDNumber] : [id number] [Interpreters_FullName] : Gabby [FreeTextEntry3] : Mandarin  [TWNoteComboBox1] : False

## 2022-07-26 ENCOUNTER — APPOINTMENT (OUTPATIENT)
Dept: OTOLARYNGOLOGY | Facility: CLINIC | Age: 32
End: 2022-07-26

## 2022-08-02 ENCOUNTER — APPOINTMENT (OUTPATIENT)
Dept: OTOLARYNGOLOGY | Facility: CLINIC | Age: 32
End: 2022-08-02
Payer: COMMERCIAL

## 2022-08-02 VITALS
WEIGHT: 118 LBS | SYSTOLIC BLOOD PRESSURE: 101 MMHG | HEIGHT: 65 IN | HEART RATE: 73 BPM | DIASTOLIC BLOOD PRESSURE: 61 MMHG | BODY MASS INDEX: 19.66 KG/M2

## 2022-08-02 DIAGNOSIS — H72.02 CENTRAL PERFORATION OF TYMPANIC MEMBRANE, LEFT EAR: ICD-10-CM

## 2022-08-02 DIAGNOSIS — H90.12 CONDUCTIVE HEARING LOSS, UNILATERAL, LEFT EAR, WITH UNRESTRICTED HEARING ON THE CONTRALATERAL SIDE: ICD-10-CM

## 2022-08-02 PROCEDURE — 92567 TYMPANOMETRY: CPT

## 2022-08-02 PROCEDURE — 99213 OFFICE O/P EST LOW 20 MIN: CPT

## 2022-08-02 PROCEDURE — 92504 EAR MICROSCOPY EXAMINATION: CPT

## 2022-08-02 PROCEDURE — 92557 COMPREHENSIVE HEARING TEST: CPT

## 2022-08-02 RX ORDER — PRENATAL VIT 49/IRON FUM/FOLIC 6.75-0.2MG
TABLET ORAL
Refills: 0 | Status: ACTIVE | COMMUNITY

## 2022-08-02 NOTE — PHYSICAL EXAM
[Midline] : trachea located in midline position [Binocular Microscopic Exam] : Binocular microscopic exam was performed [Rinne Test Air Conduction Persists > Bone Conduction Right] : air conduction greater than bone conduction on the right [Rinne Test Air Conduction Persists > Bone Conduction Left] : air conduction greater than bone conduction on the left [Hearing Moreno Test (Tuning Fork On Forehead)] : no lateralization of tone [Normal] : the left tympanic membrane was normal [Hearing Loss Right Only] : normal [Hearing Loss Left Only] : normal [Nystagmus] : ~T no ~M nystagmus was seen [Fukuda Step Test] : Fukuda Step Test was Negative [Romberg's Sign] : Romberg's sign was absent [Fistula Sign] : Fistula Sign: Negative [Past-Pointing] : Past-Pointing: Negative [Raúl-Hallpatyke] : [place]-Hallpike: Negative

## 2022-08-02 NOTE — DATA REVIEWED
[de-identified] : An audiogram was ordered and performed including tympanometry, pure tones and speech, for patient's complaint of L TM perf\par I have independently reviewed the patient's audiogram from today and my findings include essentially fully recovered hearing with 5dB difference in SRT

## 2022-08-02 NOTE — CONSULT LETTER
[Dear  ___] : Dear  [unfilled], [Courtesy Letter:] : I had the pleasure of seeing your patient, [unfilled], in my office today. [Please see my note below.] : Please see my note below. [Consult Closing:] : Thank you very much for allowing me to participate in the care of this patient.  If you have any questions, please do not hesitate to contact me. [Sincerely,] : Sincerely, [FreeTextEntry3] : Zac Arango MD, Otology Neurotology & Skull Base Surgery

## 2022-08-02 NOTE — HISTORY OF PRESENT ILLNESS
[de-identified] : 32 year old presents for follow-up for Left CHL due to left traumatic tympanic perforation\par Ptient reports improvement in hearing but not fully recovered--hearing can be muffled at times "feels like she is under water".\par Reports intermittent left discomfort--random times.\par Patient is currently breast feeding \par Patient denies otorrhea, tinnitus, recent fever or ear infections, dizziness, vertigo, headaches related to hearing.

## 2022-08-02 NOTE — PROCEDURE
[Same] : same as the Pre Op Dx. [] : Binocular Microscopy [FreeTextEntry4] : none [FreeTextEntry6] : Operative microscope was used to examine the ear canal, ear drum, and visible middle ear landmarks. Adequate exam would not have been possible without the use of a microscope. Findings are described.

## 2023-11-09 NOTE — OB PROVIDER IHI INDUCTION/AUGMENTATION NOTE - NS_OBIHIINDICATION_OBGYN_ALL_OB
Detail Level: Zone Initiate Treatment: Neutrogena Rain Bath gel QD Render In Strict Bullet Format?: No Failureto dilate  Failure to descend  Inadequate uterine contraction Initiate Treatment: ketoconazole 2 % shampoo \\nApply in shower qd\\n\\nclobetasol 0.05 % scalp solution \\nApply to rash bid prn

## 2024-04-05 NOTE — OB RN TRIAGE NOTE - PRO MENTAL HEALTH SX RECENT
Prep Survey      Flowsheet Row Responses   [facility] facility patient discharged from? [place]   Is LACE score < 7 ? No   Eligibility Readm Mgmt   Discharge diagnosis ESRD needing dialysis   Does the patient have one of the following disease processes/diagnoses(primary or secondary)? Other   Does the patient have Home health ordered? No   Is there a DME ordered? No   Prep survey completed? Yes            YISEL VAUGHN - Registered Nurse          
none

## 2024-08-29 ENCOUNTER — OUTPATIENT (OUTPATIENT)
Dept: OUTPATIENT SERVICES | Facility: HOSPITAL | Age: 34
LOS: 1 days | End: 2024-08-29
Payer: COMMERCIAL

## 2024-08-29 ENCOUNTER — TRANSCRIPTION ENCOUNTER (OUTPATIENT)
Age: 34
End: 2024-08-29

## 2024-08-29 VITALS
SYSTOLIC BLOOD PRESSURE: 118 MMHG | DIASTOLIC BLOOD PRESSURE: 78 MMHG | RESPIRATION RATE: 18 BRPM | HEART RATE: 88 BPM | OXYGEN SATURATION: 99 % | HEIGHT: 65.35 IN | WEIGHT: 110.01 LBS | TEMPERATURE: 98 F

## 2024-08-29 DIAGNOSIS — O02.1 MISSED ABORTION: ICD-10-CM

## 2024-08-29 LAB
BLD GP AB SCN SERPL QL: NEGATIVE — SIGNIFICANT CHANGE UP
HCT VFR BLD CALC: 35.3 % — SIGNIFICANT CHANGE UP (ref 34.5–45)
HGB BLD-MCNC: 11.5 G/DL — SIGNIFICANT CHANGE UP (ref 11.5–15.5)
MCHC RBC-ENTMCNC: 28.4 PG — SIGNIFICANT CHANGE UP (ref 27–34)
MCHC RBC-ENTMCNC: 32.6 GM/DL — SIGNIFICANT CHANGE UP (ref 32–36)
MCV RBC AUTO: 87.2 FL — SIGNIFICANT CHANGE UP (ref 80–100)
NRBC # BLD: 0 /100 WBCS — SIGNIFICANT CHANGE UP (ref 0–0)
PLATELET # BLD AUTO: 215 K/UL — SIGNIFICANT CHANGE UP (ref 150–400)
RBC # BLD: 4.05 M/UL — SIGNIFICANT CHANGE UP (ref 3.8–5.2)
RBC # FLD: 13.4 % — SIGNIFICANT CHANGE UP (ref 10.3–14.5)
RH IG SCN BLD-IMP: POSITIVE — SIGNIFICANT CHANGE UP
WBC # BLD: 7.31 K/UL — SIGNIFICANT CHANGE UP (ref 3.8–10.5)
WBC # FLD AUTO: 7.31 K/UL — SIGNIFICANT CHANGE UP (ref 3.8–10.5)

## 2024-08-29 PROCEDURE — 86901 BLOOD TYPING SEROLOGIC RH(D): CPT

## 2024-08-29 PROCEDURE — 86850 RBC ANTIBODY SCREEN: CPT

## 2024-08-29 PROCEDURE — G0463: CPT

## 2024-08-29 PROCEDURE — 86900 BLOOD TYPING SEROLOGIC ABO: CPT

## 2024-08-29 PROCEDURE — 85027 COMPLETE CBC AUTOMATED: CPT

## 2024-08-29 NOTE — H&P PST ADULT - ATTENDING COMMENTS
Gyn Attg Note:   Pt was counseled today at the preop area, re the scheduled gyn operation: DVC for missed ab in 1st trimester.   Discussed and reviewed indication for surgery, possible risks and operative complications, including but not limited to postop infection, excessive bleeding, blood transfusion, injuries to the gyn organs or the surrounding organs and tissues such as the urologic organs or the GI tract., scarring of endometrial cavity possibly causing subfertility or infertility in the future or menstrual abnormalities.  Postop care, recovery, return to nl activities, pain mgmt also discussed.   All q's answered.  Consent signed by pt and witnessed.

## 2024-08-29 NOTE — H&P PST ADULT - IN ACCORDANCE WITH [place] STATE LAW, WE OFFER EVERY PATIENT A HEPATITIS C TEST. WOULD YOU LIKE TO BE TESTED TODAY?
Markell wants to let PCP know she thinks his wound is now closed, She still covered it up to protect the area and will continue to see him until his appt nest Thursday.     MD CHEW   Opt out

## 2024-08-29 NOTE — H&P PST ADULT - TEMPERATURE IN CELSIUS (DEGREES C)
[facility] Discharge Instructions    Immunizations/Screenings:     Infant Blood Type: O Rh Positive   Screen done: Done   Immunizations:   Most Recent Immunizations   Administered Date(s) Administered   • Hep B, adolescent or pediatric 2020   Pended Date(s) Pended   • Hepatitis B Immune Globulin 2020      [facility] Hearing Test Machine: Auditory Brainstem Response (Algo) (20 0400)  [facility] Hearing Test Results: Pass R;Pass L (20 0400)    Safe Sleep: Reduce your baby’s risk of SIDS (Sudden Infant Death Syndrome) by practicing Safe Sleep during naps and at night.  · Always place your baby on his/her back in a safety approved crib, bassinet, or portable play area. DO NOT use a carseat, adult bed, swing, carrier, or similar products for everyday sleep.    · Place your baby on a firm surface, covered with a fitted sheet, and NEVER on a couch, pillow, quilt, or with fluffy materials.  · No pillows, stuffed animals, toys, or crib bumpers.     Carseat Safety:   · It is recommended that children under the age of two should always be in a rear-facing seat that is installed in the back seat.   · Proper fit: Harness straps should lie flat and be snug when clipped.  The chest clip should be at armpit level. Adjust as your baby grows    Feeding your baby:  · Your baby should be fed on cue (wakes up, sucking on hands, turning head with mouth open, and/or then cries).  Your baby will eat every 2-3 hours day and night, or  8-12 times in 24 hours.  Your baby will let you know he/she is full when they detach off nipple (mother’s or a bottle), suck less vigorously, or becomes sleepy and relaxed.   · Keep a record of your baby’s feedings and diapers just like you did in the hospital until the baby is seen by the pediatrician.    For breastfeeding tips please refer to page 32 of the breastfeeding section in your A New Beginning booklet given to you by your caregivers.    Tummy Time:  Allow your alert and awake  baby to have 30-60 minutes of supervised tummy time each day.    Bathing your baby:  Babies have sensitive skin that can dry out easily. Wipe your baby’s face with just warm water. Wash his/her body with a soapy wash cloth until umbilical cord falls off and/or circumcision heals (about 1 week of age); then you can bathe the baby in a tub.     Umbilical Cord:   Keep umbilical cord dry. Do not apply any medications or alcohol to site.   The cord will fall off in about 1 week.     Jaundice: Yellowing of the skin or “jaundice” is common in newborns. The yellow appearance is most noticeable in eyes and skin and is caused by bilirubin.  Jaundice is normal but can become dangerous if the level of bilirubin is too high.  Your baby’s doctor will monitor your infant’s bilirubin level and treat it as necessary. You may have to bring your infant to the pediatrician for a blood test if bilirubin was high in the hospital. Call your pediatrician if your baby’s skin or eyes become yellow or your baby becomes lethargic (too sleepy).      Safety at Home:   Accidental infant falls can happen; the key to avoid a fall is prevention.    Remember as you are recovering the medications you may be taking may make you more tired.  Keep this in mind when holding your baby.  If you are feeling sleepy or plan on sleeping place your baby in a safe place such as their crib or bassinet.     Babies wiggle, move, and push against things with their feet.  Even these early movements can result in a fall.  Do not leave your baby alone on a changing table, bed, sofa, or chair.  If you cannot hold your baby put them in a safe place such as their crib or playpen.    If you have other children please be cautious and assist your older children while they are holding baby.  If your child has a serious fall or does not act normally after a fall, call your doctor.      Call your Pediatrician:  · Fever 100 degrees F or above  · Forceful vomiting (not spitting  up)  · Several feedings when infant does not suck  · Watery, runny stools (mucous, blood or foul odor)  · Infant injury (fall from bed or table, dropped or severely shaken)  · Constant crying  · Any unusual rash  · Yellow color of the eyes or skin  · Has less than 4 wet diapers in a 24 hr period in the first week of life, and less   · than 6 wet diapers in a 24 hr period after the baby is 7 days old  · No stool for 48 hours  · Redness, drainage or foul odor from the umbilical cord.    If you have additional questions about these discharge instructions, please call the [facility] Nurse's station at [phone]   36.8

## 2024-08-29 NOTE — H&P PST ADULT - HISTORY OF PRESENT ILLNESS
34yr old female (@~8wks gestation) presents to PST for a scheduled D&C for Missed  on 2024. No significant PMH. Denies recent fevers, chills, cough, chest pain or SOB and feels well overall.

## 2024-08-29 NOTE — H&P PST ADULT - NSICDXPROCEDURE_GEN_ALL_CORE_FT
PROCEDURES:  D&C, uterus, therapeutic, for incomplete, missed, septic, or induced  29-Aug-2024 15:01:05  Terrie Ferrari

## 2024-08-30 ENCOUNTER — TRANSCRIPTION ENCOUNTER (OUTPATIENT)
Age: 34
End: 2024-08-30

## 2024-08-30 ENCOUNTER — OUTPATIENT (OUTPATIENT)
Dept: OUTPATIENT SERVICES | Facility: HOSPITAL | Age: 34
LOS: 1 days | End: 2024-08-30
Payer: COMMERCIAL

## 2024-08-30 VITALS
TEMPERATURE: 97 F | HEART RATE: 60 BPM | DIASTOLIC BLOOD PRESSURE: 58 MMHG | RESPIRATION RATE: 14 BRPM | OXYGEN SATURATION: 100 % | SYSTOLIC BLOOD PRESSURE: 110 MMHG

## 2024-08-30 VITALS
SYSTOLIC BLOOD PRESSURE: 99 MMHG | OXYGEN SATURATION: 100 % | HEART RATE: 63 BPM | TEMPERATURE: 97 F | WEIGHT: 110.01 LBS | RESPIRATION RATE: 16 BRPM | HEIGHT: 65.35 IN | DIASTOLIC BLOOD PRESSURE: 53 MMHG

## 2024-08-30 DIAGNOSIS — O02.1 MISSED ABORTION: ICD-10-CM

## 2024-08-30 PROCEDURE — 88291 CYTO/MOLECULAR REPORT: CPT

## 2024-08-30 PROCEDURE — 59820 CARE OF MISCARRIAGE: CPT

## 2024-08-30 PROCEDURE — 88280 CHROMOSOME KARYOTYPE STUDY: CPT

## 2024-08-30 PROCEDURE — 88305 TISSUE EXAM BY PATHOLOGIST: CPT | Mod: 26

## 2024-08-30 PROCEDURE — 88305 TISSUE EXAM BY PATHOLOGIST: CPT

## 2024-08-30 PROCEDURE — 88233 TISSUE CULTURE SKIN/BIOPSY: CPT

## 2024-08-30 PROCEDURE — 88264 CHROMOSOME ANALYSIS 20-25: CPT

## 2024-08-30 RX ORDER — ONDANSETRON 2 MG/ML
4 INJECTION, SOLUTION INTRAMUSCULAR; INTRAVENOUS ONCE
Refills: 0 | Status: ACTIVE | OUTPATIENT
Start: 2024-08-30 | End: 2025-07-29

## 2024-08-30 RX ORDER — LIDOCAINE HCL 20 MG/ML
0.2 VIAL (ML) INJECTION ONCE
Refills: 0 | Status: COMPLETED | OUTPATIENT
Start: 2024-08-30 | End: 2024-08-30

## 2024-08-30 RX ORDER — FENTANYL CITRATE 50 UG/ML
25 INJECTION INTRAMUSCULAR; INTRAVENOUS
Refills: 0 | Status: DISCONTINUED | OUTPATIENT
Start: 2024-08-30 | End: 2024-08-30

## 2024-08-30 RX ORDER — HYDROMORPHONE HYDROCHLORIDE 2 MG/1
0.5 TABLET ORAL
Refills: 0 | Status: DISCONTINUED | OUTPATIENT
Start: 2024-08-30 | End: 2024-08-30

## 2024-08-30 RX ADMIN — Medication 100 MILLILITER(S): at 08:44

## 2024-08-30 NOTE — PRE-ANESTHESIA EVALUATION ADULT - NSANTHPMHFT_GEN_ALL_CORE
34yr old female (@~8wks gestation) presents to PST for a scheduled D&C for Missed  on 2024. No significant PMH.

## 2024-08-30 NOTE — ASU DISCHARGE PLAN (ADULT/PEDIATRIC) - ASU DC SPECIAL INSTRUCTIONSFT
Postoperative Instructions    For pain control, take the followin. Ibuprofen 600mg every 6 hours, take with food  2. Add Tylenol 975mg every 6 hours, alternated with ibuprofen  Tylenol and ibuprofen may be obtained over the counter.    Return to your regular way of eating.     Resume normal activity as tolerated. Complete vaginal rest, no tampons, no douching, no tub bathing, no sexual activities for 2 weeks unless otherwise instructed by your doctor.      Call your doctor with any signs and symptoms of infection such as fever (>100.4 F), chills, nausea or vomiting.  Call your doctor if you're unable to tolerate food or have difficulty urinating.  Call your doctor if you have pain that is not relieved by Tylenol/Motrin.    Notify your doctor with any other concerns. Follow up with your doctor in 2 weeks for a post-operative appointment. Postoperative Instructions    ********************************************************************************************   For pain control, take the followin. Ibuprofen 600mg every 6 hours, take with food  2. Add Tylenol 975mg every 6 hours, alternated with ibuprofen  Tylenol and ibuprofen may be obtained over the counter.    ********************************************************************************************   Return to your regular way of eating.     ********************************************************************************************   Resume normal activity as tolerated. Complete vaginal rest, no tampons, no douching, no tub bathing, no sexual activities for 2 weeks unless otherwise instructed by your doctor.      ********************************************************************************************   Call your doctor with any signs and symptoms of infection such as fever (>100.4 F), chills, nausea or vomiting.  Call your doctor if you're unable to tolerate food or have difficulty urinating.  Call your doctor if you have pain that is not relieved by Tylenol/Motrin.    ********************************************************************************************   Notify your doctor with any other concerns. Follow up with your doctor in 2 weeks for a post-operative appointment.

## 2024-08-30 NOTE — ASU DISCHARGE PLAN (ADULT/PEDIATRIC) - CARE PROVIDER_API CALL
Mejia Govea  Obstetrics and Gynecology  [address] [facility], [address]  [place] [address]  Phone: [phone]  Fax: [phone]  Follow Up Time: 2 weeks

## 2024-08-30 NOTE — ASU DISCHARGE PLAN (ADULT/PEDIATRIC) - NOTHING PER RECTUM DURATION
DATE OF ADMISSION:  01/18/2020    DATE OF DISCHARGE:      HOSPITAL COURSE:  The patient is a 80-year-old male who was admitted with a worsening epididymitis, in extreme pain and swelling in the right scrotum.  His recurrent medical issues include chronic atrial fibrillation on Coumadin, hypertension, and hyperlipidemia.  He has not seen a urologist in sometime.  His routine cardiologist is Dr. Paz.  The patient was admitted, given IV fluids, and IV ceftriaxone.  Has seen in consultation by  Cardiology and by Urology.  He was continued on his Coumadin.  The urologist recommended quinolone or sulfa and of course close monitoring of INR.  Given effects of quinolones on INR, I have chosen that the patient should be on Bactrim.  The patient's heart rate was borderline elevated in terms of his rate control.  The patient has actually atrial flutter and atrial fibrillation according to cardiologist and is under consideration for possibly  ablation as an outpatient and he will be seeing Dr. Mckeon as an outpatient.  The patient also is known to have abdominal aortic aneurysm, which is being followed by Dr. Hernandez who is actually his outpatient cardiologist.    PHYSICAL EXAMINATION:  GENERAL:  On the day of discharge, the patient is pleasant, alert, oriented, conversational.     VITAL SIGNS:  Temperature 97.5, pulse 74, respirations 18, blood pressure 114/65, O2 sat 95% on room air.     HEENT:  Normal.     NECK:  Supple.     CHEST:  Clear.     CARDIAC:  Without murmur.     ABDOMEN:  Soft, nontender.     GENITOURINARY:  The right scrotum is larger than the left with a known hydrocele, some erythema is present, but much decreased and warmth is also decreased, and tenderness is decreased compared to previous exam.     EXTREMITIES:  Without edema.    ASSESSMENT:  Right-sided epididymitis and hydrocele, improving.     We will send the patient home on Bactrim for 7 days; Norco 5-325, 60 pills; on a reduce dose of Coumadin while  on Bactrim of 3 mg daily.  To be seen by Home Care and to have pro-time tomorrow.  To see cardiologist as outpatient regarding followup of atrial flutter, possible ablation, and also of course to see urologist.  The patient prefers to see a urologist located in [place], will be seeing Dr. Roy and to see Dr. Galvan, his primary care  physician in our office within 1-2 weeks.        ______________________________  Sandee Colunga MD  [id number]      D:  01/20/2020 08:24:12  T:  01/20/2020 09:27:45   DP/modl   Job:  [id number]   2 weeks

## 2024-08-30 NOTE — BRIEF OPERATIVE NOTE - NSICDXBRIEFPROCEDURE_GEN_ALL_CORE_FT
PROCEDURES:  Dilation and curettage, uterus, using suction, with US guidance 30-Aug-2024 10:57:38  Azul Rowland

## 2024-08-30 NOTE — BRIEF OPERATIVE NOTE - OPERATION/FINDINGS
Intrauterine gestation at ~8w visualized on ultrasound  exam under anesthesia revealed normal external genitalia, normal cervix, ~7w size anteverted uterus  entire uterine contents evacuated under ultrasound guidance  thin endometrial stripe at conclusion of case  Blood Type: B+

## 2024-09-13 LAB
CHROM ANALY OVERALL INTERP SPEC-IMP: SIGNIFICANT CHANGE UP
SURGICAL PATHOLOGY STUDY: SIGNIFICANT CHANGE UP

## 2024-12-05 NOTE — OB PROVIDER TRIAGE NOTE - NS_FETALHEARTRATE_OBGYN_ALL_OB_FT
Patient stopped in for [employer] patient assistance. She wanted to fax her portion. I gave her our portion to fax with hers. Verified understanding.   
125

## 2025-02-06 PROBLEM — O02.1 MISSED ABORTION: Chronic | Status: ACTIVE | Noted: 2024-08-29

## 2025-02-24 ENCOUNTER — APPOINTMENT (OUTPATIENT)
Dept: ANTEPARTUM | Facility: CLINIC | Age: 35
End: 2025-02-24
Payer: COMMERCIAL

## 2025-02-24 ENCOUNTER — ASOB RESULT (OUTPATIENT)
Age: 35
End: 2025-02-24

## 2025-02-24 PROCEDURE — 76801 OB US < 14 WKS SINGLE FETUS: CPT | Mod: 59

## 2025-02-24 PROCEDURE — 76813 OB US NUCHAL MEAS 1 GEST: CPT

## 2025-04-17 NOTE — DISCHARGE NOTE OB - BREAST MILK PROVIDES PROTECTION AGAINST INFECTION
Statement Selected 61-year-old female history of hypertension, diabetes presenting to ER with 1 week of right buttock pain rating down to right lower extremity.  Symptoms are intermittent and worse with standing/walking and improved with rest.  She denies any trauma or injury to the area, numbness, extremity weakness, inability to bear weight or ambulate, bowel or bladder incontinence, saddle anesthesia, paresthesias, urinary symptoms, flank pain, fever.

## 2025-04-21 ENCOUNTER — APPOINTMENT (OUTPATIENT)
Dept: ANTEPARTUM | Facility: CLINIC | Age: 35
End: 2025-04-21
Payer: COMMERCIAL

## 2025-04-21 ENCOUNTER — ASOB RESULT (OUTPATIENT)
Age: 35
End: 2025-04-21

## 2025-04-21 PROCEDURE — 76811 OB US DETAILED SNGL FETUS: CPT

## 2025-04-28 ENCOUNTER — APPOINTMENT (OUTPATIENT)
Dept: ANTEPARTUM | Facility: CLINIC | Age: 35
End: 2025-04-28

## 2025-04-28 PROCEDURE — 76816 OB US FOLLOW-UP PER FETUS: CPT

## 2025-06-23 ENCOUNTER — ASOB RESULT (OUTPATIENT)
Age: 35
End: 2025-06-23

## 2025-06-23 ENCOUNTER — APPOINTMENT (OUTPATIENT)
Dept: ANTEPARTUM | Facility: CLINIC | Age: 35
End: 2025-06-23
Payer: COMMERCIAL

## 2025-06-23 PROCEDURE — 76816 OB US FOLLOW-UP PER FETUS: CPT

## 2025-07-28 NOTE — ASU PATIENT PROFILE, ADULT - MENTAL HEALTH CONDITIONS/SYMPTOMS, PROFILE
Patient ID:  Eagle Mathews  [id number]  59 year old  1965    Admit date: 7/26/2025    Discharge date and time: 7/27/2025 1230PM     Admitting Physician: Petey Burkett MD     Discharge Physician: Petey Burkett Ventura treadmill score, 8.    * Ventura none

## 2025-08-11 ENCOUNTER — ASOB RESULT (OUTPATIENT)
Age: 35
End: 2025-08-11

## 2025-08-11 ENCOUNTER — APPOINTMENT (OUTPATIENT)
Dept: ANTEPARTUM | Facility: CLINIC | Age: 35
End: 2025-08-11
Payer: COMMERCIAL

## 2025-08-11 PROCEDURE — 76816 OB US FOLLOW-UP PER FETUS: CPT

## 2025-08-29 ENCOUNTER — INPATIENT (INPATIENT)
Facility: HOSPITAL | Age: 35
LOS: 1 days | Discharge: HOME | DRG: 951 | End: 2025-08-31
Attending: OBSTETRICS & GYNECOLOGY | Admitting: OBSTETRICS & GYNECOLOGY
Payer: COMMERCIAL

## 2025-08-29 VITALS — OXYGEN SATURATION: 99 % | HEART RATE: 73 BPM

## 2025-08-29 DIAGNOSIS — O26.899 OTHER SPECIFIED PREGNANCY RELATED CONDITIONS, UNSPECIFIED TRIMESTER: ICD-10-CM

## 2025-08-29 DIAGNOSIS — Z34.80 ENCOUNTER FOR SUPERVISION OF OTHER NORMAL PREGNANCY, UNSPECIFIED TRIMESTER: ICD-10-CM

## 2025-08-29 LAB
APTT BLD: 25.8 SEC — LOW (ref 26.1–36.8)
BASOPHILS # BLD AUTO: 0.01 K/UL — SIGNIFICANT CHANGE UP (ref 0–0.2)
BASOPHILS # BLD AUTO: 0.04 K/UL — SIGNIFICANT CHANGE UP (ref 0–0.2)
BASOPHILS NFR BLD AUTO: 0.1 % — SIGNIFICANT CHANGE UP (ref 0–2)
BASOPHILS NFR BLD AUTO: 0.2 % — SIGNIFICANT CHANGE UP (ref 0–2)
BLD GP AB SCN SERPL QL: NEGATIVE — SIGNIFICANT CHANGE UP
EOSINOPHIL # BLD AUTO: 0.01 K/UL — SIGNIFICANT CHANGE UP (ref 0–0.5)
EOSINOPHIL # BLD AUTO: 0.01 K/UL — SIGNIFICANT CHANGE UP (ref 0–0.5)
EOSINOPHIL NFR BLD AUTO: 0.1 % — SIGNIFICANT CHANGE UP (ref 0–6)
EOSINOPHIL NFR BLD AUTO: 0.1 % — SIGNIFICANT CHANGE UP (ref 0–6)
FIBRINOGEN PPP-MCNC: 311 MG/DL — SIGNIFICANT CHANGE UP (ref 200–445)
HBV SURFACE AG SERPL QL IA: SIGNIFICANT CHANGE UP
HCT VFR BLD CALC: 32.9 % — LOW (ref 34.5–45)
HCT VFR BLD CALC: 39 % — SIGNIFICANT CHANGE UP (ref 34.5–45)
HCV AB S/CO SERPL IA: 0.04 S/CO — SIGNIFICANT CHANGE UP
HCV AB SERPL-IMP: SIGNIFICANT CHANGE UP
HGB BLD-MCNC: 11 G/DL — LOW (ref 11.5–15.5)
HGB BLD-MCNC: 13.1 G/DL — SIGNIFICANT CHANGE UP (ref 11.5–15.5)
IMM GRANULOCYTES # BLD AUTO: 0.05 K/UL — SIGNIFICANT CHANGE UP (ref 0–0.07)
IMM GRANULOCYTES # BLD AUTO: 0.1 K/UL — HIGH (ref 0–0.07)
IMM GRANULOCYTES NFR BLD AUTO: 0.5 % — SIGNIFICANT CHANGE UP (ref 0–0.9)
IMM GRANULOCYTES NFR BLD AUTO: 0.6 % — SIGNIFICANT CHANGE UP (ref 0–0.9)
INR BLD: 0.87 RATIO — SIGNIFICANT CHANGE UP (ref 0.85–1.16)
LYMPHOCYTES # BLD AUTO: 1.42 K/UL — SIGNIFICANT CHANGE UP (ref 1–3.3)
LYMPHOCYTES # BLD AUTO: 1.81 K/UL — SIGNIFICANT CHANGE UP (ref 1–3.3)
LYMPHOCYTES NFR BLD AUTO: 19.3 % — SIGNIFICANT CHANGE UP (ref 13–44)
LYMPHOCYTES NFR BLD AUTO: 8.1 % — LOW (ref 13–44)
MCHC RBC-ENTMCNC: 30 PG — SIGNIFICANT CHANGE UP (ref 27–34)
MCHC RBC-ENTMCNC: 30.3 PG — SIGNIFICANT CHANGE UP (ref 27–34)
MCHC RBC-ENTMCNC: 33.4 G/DL — SIGNIFICANT CHANGE UP (ref 32–36)
MCHC RBC-ENTMCNC: 33.6 G/DL — SIGNIFICANT CHANGE UP (ref 32–36)
MCV RBC AUTO: 89.2 FL — SIGNIFICANT CHANGE UP (ref 80–100)
MCV RBC AUTO: 90.6 FL — SIGNIFICANT CHANGE UP (ref 80–100)
MONOCYTES # BLD AUTO: 0.6 K/UL — SIGNIFICANT CHANGE UP (ref 0–0.9)
MONOCYTES # BLD AUTO: 1.23 K/UL — HIGH (ref 0–0.9)
MONOCYTES NFR BLD AUTO: 6.4 % — SIGNIFICANT CHANGE UP (ref 2–14)
MONOCYTES NFR BLD AUTO: 7.1 % — SIGNIFICANT CHANGE UP (ref 2–14)
NEUTROPHILS # BLD AUTO: 14.63 K/UL — HIGH (ref 1.8–7.4)
NEUTROPHILS # BLD AUTO: 6.92 K/UL — SIGNIFICANT CHANGE UP (ref 1.8–7.4)
NEUTROPHILS NFR BLD AUTO: 73.6 % — SIGNIFICANT CHANGE UP (ref 43–77)
NEUTROPHILS NFR BLD AUTO: 83.9 % — HIGH (ref 43–77)
NRBC # BLD AUTO: 0 K/UL — SIGNIFICANT CHANGE UP (ref 0–0)
NRBC # BLD AUTO: 0 K/UL — SIGNIFICANT CHANGE UP (ref 0–0)
NRBC # FLD: 0 K/UL — SIGNIFICANT CHANGE UP (ref 0–0)
NRBC # FLD: 0 K/UL — SIGNIFICANT CHANGE UP (ref 0–0)
NRBC BLD AUTO-RTO: 0 /100 WBCS — SIGNIFICANT CHANGE UP (ref 0–0)
NRBC BLD AUTO-RTO: 0 /100 WBCS — SIGNIFICANT CHANGE UP (ref 0–0)
PLATELET # BLD AUTO: 136 K/UL — LOW (ref 150–400)
PLATELET # BLD AUTO: 165 K/UL — SIGNIFICANT CHANGE UP (ref 150–400)
PMV BLD: 10.7 FL — SIGNIFICANT CHANGE UP (ref 7–13)
PMV BLD: 11.2 FL — SIGNIFICANT CHANGE UP (ref 7–13)
PROTHROM AB SERPL-ACNC: 10.1 SEC — SIGNIFICANT CHANGE UP (ref 9.9–13.4)
RBC # BLD: 3.63 M/UL — LOW (ref 3.8–5.2)
RBC # BLD: 4.37 M/UL — SIGNIFICANT CHANGE UP (ref 3.8–5.2)
RBC # FLD: 13.7 % — SIGNIFICANT CHANGE UP (ref 10.3–14.5)
RBC # FLD: 13.8 % — SIGNIFICANT CHANGE UP (ref 10.3–14.5)
RH IG SCN BLD-IMP: POSITIVE — SIGNIFICANT CHANGE UP
RUBV IGG SER-ACNC: 6.18 INDEX — SIGNIFICANT CHANGE UP
RUBV IGG SER-IMP: POSITIVE — SIGNIFICANT CHANGE UP
T PALLIDUM AB TITR SER: NEGATIVE — SIGNIFICANT CHANGE UP
WBC # BLD: 17.43 K/UL — HIGH (ref 3.8–10.5)
WBC # BLD: 9.4 K/UL — SIGNIFICANT CHANGE UP (ref 3.8–10.5)
WBC # FLD AUTO: 17.43 K/UL — HIGH (ref 3.8–10.5)
WBC # FLD AUTO: 9.4 K/UL — SIGNIFICANT CHANGE UP (ref 3.8–10.5)

## 2025-08-29 PROCEDURE — 85730 THROMBOPLASTIN TIME PARTIAL: CPT

## 2025-08-29 PROCEDURE — 86901 BLOOD TYPING SEROLOGIC RH(D): CPT

## 2025-08-29 PROCEDURE — 86850 RBC ANTIBODY SCREEN: CPT

## 2025-08-29 PROCEDURE — 86780 TREPONEMA PALLIDUM: CPT

## 2025-08-29 PROCEDURE — 85025 COMPLETE CBC W/AUTO DIFF WBC: CPT

## 2025-08-29 PROCEDURE — 86762 RUBELLA ANTIBODY: CPT

## 2025-08-29 PROCEDURE — 87340 HEPATITIS B SURFACE AG IA: CPT

## 2025-08-29 PROCEDURE — 59050 FETAL MONITOR W/REPORT: CPT

## 2025-08-29 PROCEDURE — 85610 PROTHROMBIN TIME: CPT

## 2025-08-29 PROCEDURE — 86803 HEPATITIS C AB TEST: CPT

## 2025-08-29 PROCEDURE — 85384 FIBRINOGEN ACTIVITY: CPT

## 2025-08-29 PROCEDURE — 86900 BLOOD TYPING SEROLOGIC ABO: CPT

## 2025-08-29 RX ORDER — OXYCODONE HYDROCHLORIDE 30 MG/1
5 TABLET ORAL
Refills: 0 | Status: DISCONTINUED | OUTPATIENT
Start: 2025-08-29 | End: 2025-08-31

## 2025-08-29 RX ORDER — DIBUCAINE 10 MG/G
1 OINTMENT TOPICAL EVERY 6 HOURS
Refills: 0 | Status: DISCONTINUED | OUTPATIENT
Start: 2025-08-29 | End: 2025-08-31

## 2025-08-29 RX ORDER — SODIUM CHLORIDE 9 G/1000ML
1000 INJECTION, SOLUTION INTRAVENOUS ONCE
Refills: 0 | Status: COMPLETED | OUTPATIENT
Start: 2025-08-29 | End: 2025-08-29

## 2025-08-29 RX ORDER — BENZOCAINE 220 MG/G
1 SPRAY, METERED PERIODONTAL EVERY 6 HOURS
Refills: 0 | Status: DISCONTINUED | OUTPATIENT
Start: 2025-08-29 | End: 2025-08-31

## 2025-08-29 RX ORDER — MODIFIED LANOLIN 100 %
1 CREAM (GRAM) TOPICAL EVERY 6 HOURS
Refills: 0 | Status: DISCONTINUED | OUTPATIENT
Start: 2025-08-29 | End: 2025-08-31

## 2025-08-29 RX ORDER — SODIUM CHLORIDE 9 G/1000ML
500 INJECTION, SOLUTION INTRAVENOUS ONCE
Refills: 0 | Status: COMPLETED | OUTPATIENT
Start: 2025-08-29 | End: 2025-08-29

## 2025-08-29 RX ORDER — DIPHENHYDRAMINE HCL 12.5MG/5ML
25 ELIXIR ORAL EVERY 6 HOURS
Refills: 0 | Status: DISCONTINUED | OUTPATIENT
Start: 2025-08-29 | End: 2025-08-31

## 2025-08-29 RX ORDER — OXYTOCIN-SODIUM CHLORIDE 0.9% IV SOLN 30 UNIT/500ML 30-0.9/5 UT/ML-%
4 SOLUTION INTRAVENOUS
Qty: 30 | Refills: 0 | Status: DISCONTINUED | OUTPATIENT
Start: 2025-08-29 | End: 2025-08-31

## 2025-08-29 RX ORDER — SODIUM CHLORIDE 9 G/1000ML
1000 INJECTION, SOLUTION INTRAVENOUS
Refills: 0 | Status: DISCONTINUED | OUTPATIENT
Start: 2025-08-29 | End: 2025-08-29

## 2025-08-29 RX ORDER — PRAMOXINE HCL 1 %
1 GEL (GRAM) TOPICAL EVERY 4 HOURS
Refills: 0 | Status: DISCONTINUED | OUTPATIENT
Start: 2025-08-29 | End: 2025-08-31

## 2025-08-29 RX ORDER — ACETAMINOPHEN 500 MG/5ML
975 LIQUID (ML) ORAL
Refills: 0 | Status: DISCONTINUED | OUTPATIENT
Start: 2025-08-29 | End: 2025-08-31

## 2025-08-29 RX ORDER — VARICELLA VACCINE LIVE/PF
0.5 VIAL (EA) SUBCUTANEOUS ONCE
Refills: 0 | Status: DISCONTINUED | OUTPATIENT
Start: 2025-08-29 | End: 2025-08-31

## 2025-08-29 RX ORDER — CITRIC ACID/SODIUM CITRATE 300-500 MG
15 SOLUTION, ORAL ORAL EVERY 6 HOURS
Refills: 0 | Status: DISCONTINUED | OUTPATIENT
Start: 2025-08-29 | End: 2025-08-29

## 2025-08-29 RX ORDER — MAGNESIUM HYDROXIDE 400 MG/5ML
30 SUSPENSION ORAL
Refills: 0 | Status: DISCONTINUED | OUTPATIENT
Start: 2025-08-29 | End: 2025-08-31

## 2025-08-29 RX ORDER — OXYCODONE HYDROCHLORIDE 30 MG/1
5 TABLET ORAL ONCE
Refills: 0 | Status: DISCONTINUED | OUTPATIENT
Start: 2025-08-29 | End: 2025-08-31

## 2025-08-29 RX ORDER — OXYTOCIN-SODIUM CHLORIDE 0.9% IV SOLN 30 UNIT/500ML 30-0.9/5 UT/ML-%
167 SOLUTION INTRAVENOUS
Qty: 30 | Refills: 0 | Status: DISCONTINUED | OUTPATIENT
Start: 2025-08-29 | End: 2025-08-31

## 2025-08-29 RX ORDER — PRENATAL 136/IRON/FOLIC ACID 27 MG-1 MG
1 TABLET ORAL DAILY
Refills: 0 | Status: DISCONTINUED | OUTPATIENT
Start: 2025-08-29 | End: 2025-08-31

## 2025-08-29 RX ORDER — IBUPROFEN 200 MG
600 TABLET ORAL EVERY 6 HOURS
Refills: 0 | Status: COMPLETED | OUTPATIENT
Start: 2025-08-29 | End: 2026-07-28

## 2025-08-29 RX ORDER — KETOROLAC TROMETHAMINE 30 MG/ML
30 INJECTION, SOLUTION INTRAMUSCULAR; INTRAVENOUS ONCE
Refills: 0 | Status: DISCONTINUED | OUTPATIENT
Start: 2025-08-29 | End: 2025-08-29

## 2025-08-29 RX ORDER — OXYTOCIN-SODIUM CHLORIDE 0.9% IV SOLN 30 UNIT/500ML 30-0.9/5 UT/ML-%
167 SOLUTION INTRAVENOUS
Qty: 30 | Refills: 0 | Status: DISCONTINUED | OUTPATIENT
Start: 2025-08-29 | End: 2025-08-29

## 2025-08-29 RX ORDER — HYDROCORTISONE 10 MG/G
1 CREAM TOPICAL EVERY 6 HOURS
Refills: 0 | Status: DISCONTINUED | OUTPATIENT
Start: 2025-08-29 | End: 2025-08-31

## 2025-08-29 RX ORDER — SIMETHICONE 80 MG
80 TABLET,CHEWABLE ORAL EVERY 4 HOURS
Refills: 0 | Status: DISCONTINUED | OUTPATIENT
Start: 2025-08-29 | End: 2025-08-31

## 2025-08-29 RX ORDER — IBUPROFEN 200 MG
600 TABLET ORAL EVERY 6 HOURS
Refills: 0 | Status: DISCONTINUED | OUTPATIENT
Start: 2025-08-29 | End: 2025-08-31

## 2025-08-29 RX ORDER — WITCH HAZEL LEAF
1 FLUID EXTRACT MISCELLANEOUS EVERY 4 HOURS
Refills: 0 | Status: DISCONTINUED | OUTPATIENT
Start: 2025-08-29 | End: 2025-08-31

## 2025-08-29 RX ADMIN — Medication 975 MILLIGRAM(S): at 19:21

## 2025-08-29 RX ADMIN — KETOROLAC TROMETHAMINE 30 MILLIGRAM(S): 30 INJECTION, SOLUTION INTRAMUSCULAR; INTRAVENOUS at 13:25

## 2025-08-29 RX ADMIN — OXYTOCIN-SODIUM CHLORIDE 0.9% IV SOLN 30 UNIT/500ML 4 MILLIUNIT(S)/MIN: 30-0.9/5 SOLUTION at 10:38

## 2025-08-29 RX ADMIN — SODIUM CHLORIDE 1000 MILLILITER(S): 9 INJECTION, SOLUTION INTRAVENOUS at 17:03

## 2025-08-29 RX ADMIN — SODIUM CHLORIDE 125 MILLILITER(S): 9 INJECTION, SOLUTION INTRAVENOUS at 05:37

## 2025-08-29 RX ADMIN — Medication 1 APPLICATION(S): at 05:37

## 2025-08-30 LAB
HCT VFR BLD CALC: 28.3 % — LOW (ref 34.5–45)
HGB BLD-MCNC: 9 G/DL — LOW (ref 11.5–15.5)
MCHC RBC-ENTMCNC: 29.3 PG — SIGNIFICANT CHANGE UP (ref 27–34)
MCHC RBC-ENTMCNC: 31.8 G/DL — LOW (ref 32–36)
MCV RBC AUTO: 92.2 FL — SIGNIFICANT CHANGE UP (ref 80–100)
NRBC # BLD AUTO: 0 K/UL — SIGNIFICANT CHANGE UP (ref 0–0)
NRBC # FLD: 0 K/UL — SIGNIFICANT CHANGE UP (ref 0–0)
NRBC BLD AUTO-RTO: 0 /100 WBCS — SIGNIFICANT CHANGE UP (ref 0–0)
PLATELET # BLD AUTO: 124 K/UL — LOW (ref 150–400)
PMV BLD: 11.4 FL — SIGNIFICANT CHANGE UP (ref 7–13)
RBC # BLD: 3.07 M/UL — LOW (ref 3.8–5.2)
RBC # FLD: 14.2 % — SIGNIFICANT CHANGE UP (ref 10.3–14.5)
WBC # BLD: 12.23 K/UL — HIGH (ref 3.8–10.5)
WBC # FLD AUTO: 12.23 K/UL — HIGH (ref 3.8–10.5)

## 2025-08-30 RX ADMIN — Medication 1 TABLET(S): at 12:12

## 2025-08-30 RX ADMIN — Medication 600 MILLIGRAM(S): at 12:13

## 2025-08-30 RX ADMIN — Medication 975 MILLIGRAM(S): at 22:03

## 2025-08-30 RX ADMIN — Medication 600 MILLIGRAM(S): at 13:10

## 2025-08-30 RX ADMIN — Medication 600 MILLIGRAM(S): at 06:01

## 2025-08-30 RX ADMIN — Medication 975 MILLIGRAM(S): at 15:37

## 2025-08-30 RX ADMIN — Medication 975 MILLIGRAM(S): at 21:03

## 2025-08-30 RX ADMIN — Medication 975 MILLIGRAM(S): at 09:57

## 2025-08-30 RX ADMIN — Medication 600 MILLIGRAM(S): at 18:07

## 2025-08-30 RX ADMIN — Medication 975 MILLIGRAM(S): at 16:35

## 2025-08-30 RX ADMIN — Medication 975 MILLIGRAM(S): at 13:49

## 2025-08-31 VITALS
TEMPERATURE: 98 F | OXYGEN SATURATION: 97 % | HEART RATE: 75 BPM | RESPIRATION RATE: 18 BRPM | SYSTOLIC BLOOD PRESSURE: 113 MMHG | DIASTOLIC BLOOD PRESSURE: 69 MMHG

## 2025-08-31 LAB
HCT VFR BLD CALC: 29.5 % — LOW (ref 34.5–45)
HGB BLD-MCNC: 9.6 G/DL — LOW (ref 11.5–15.5)
MCHC RBC-ENTMCNC: 29.9 PG — SIGNIFICANT CHANGE UP (ref 27–34)
MCHC RBC-ENTMCNC: 32.5 G/DL — SIGNIFICANT CHANGE UP (ref 32–36)
MCV RBC AUTO: 91.9 FL — SIGNIFICANT CHANGE UP (ref 80–100)
NRBC # BLD AUTO: 0 K/UL — SIGNIFICANT CHANGE UP (ref 0–0)
NRBC # FLD: 0 K/UL — SIGNIFICANT CHANGE UP (ref 0–0)
NRBC BLD AUTO-RTO: 0 /100 WBCS — SIGNIFICANT CHANGE UP (ref 0–0)
PLATELET # BLD AUTO: 141 K/UL — LOW (ref 150–400)
PMV BLD: 11.4 FL — SIGNIFICANT CHANGE UP (ref 7–13)
RBC # BLD: 3.21 M/UL — LOW (ref 3.8–5.2)
RBC # FLD: 14.2 % — SIGNIFICANT CHANGE UP (ref 10.3–14.5)
WBC # BLD: 11.04 K/UL — HIGH (ref 3.8–10.5)
WBC # FLD AUTO: 11.04 K/UL — HIGH (ref 3.8–10.5)

## 2025-08-31 PROCEDURE — 85610 PROTHROMBIN TIME: CPT

## 2025-08-31 PROCEDURE — 86901 BLOOD TYPING SEROLOGIC RH(D): CPT

## 2025-08-31 PROCEDURE — 86850 RBC ANTIBODY SCREEN: CPT

## 2025-08-31 PROCEDURE — 85730 THROMBOPLASTIN TIME PARTIAL: CPT

## 2025-08-31 PROCEDURE — 85025 COMPLETE CBC W/AUTO DIFF WBC: CPT

## 2025-08-31 PROCEDURE — 86762 RUBELLA ANTIBODY: CPT

## 2025-08-31 PROCEDURE — 85027 COMPLETE CBC AUTOMATED: CPT

## 2025-08-31 PROCEDURE — 36415 COLL VENOUS BLD VENIPUNCTURE: CPT

## 2025-08-31 PROCEDURE — 85384 FIBRINOGEN ACTIVITY: CPT

## 2025-08-31 PROCEDURE — 59050 FETAL MONITOR W/REPORT: CPT

## 2025-08-31 PROCEDURE — 87340 HEPATITIS B SURFACE AG IA: CPT

## 2025-08-31 PROCEDURE — 86900 BLOOD TYPING SEROLOGIC ABO: CPT

## 2025-08-31 PROCEDURE — 86780 TREPONEMA PALLIDUM: CPT

## 2025-08-31 PROCEDURE — 86803 HEPATITIS C AB TEST: CPT

## 2025-08-31 RX ORDER — IBUPROFEN 200 MG
3 TABLET ORAL
Qty: 0 | Refills: 0 | DISCHARGE

## 2025-08-31 RX ORDER — SIMETHICONE 80 MG
1 TABLET,CHEWABLE ORAL
Qty: 0 | Refills: 0 | DISCHARGE
Start: 2025-08-31

## 2025-08-31 RX ORDER — ACETAMINOPHEN 500 MG/5ML
2 LIQUID (ML) ORAL
Qty: 0 | Refills: 0 | DISCHARGE

## 2025-08-31 RX ORDER — BENZOCAINE 220 MG/G
1 SPRAY, METERED PERIODONTAL
Qty: 0 | Refills: 0 | DISCHARGE
Start: 2025-08-31

## 2025-08-31 RX ORDER — MODIFIED LANOLIN 100 %
1 CREAM (GRAM) TOPICAL
Qty: 0 | Refills: 0 | DISCHARGE
Start: 2025-08-31

## 2025-08-31 RX ORDER — PRENATAL 136/IRON/FOLIC ACID 27 MG-1 MG
1 TABLET ORAL
Qty: 0 | Refills: 0 | DISCHARGE

## 2025-08-31 RX ORDER — DOCUSATE SODIUM 100 MG
1 CAPSULE ORAL
Qty: 0 | Refills: 0 | DISCHARGE

## 2025-08-31 RX ORDER — FERROUS SULFATE 137(45) MG
1 TABLET, EXTENDED RELEASE ORAL
Qty: 0 | Refills: 0 | DISCHARGE

## 2025-08-31 RX ADMIN — Medication 600 MILLIGRAM(S): at 12:30

## 2025-08-31 RX ADMIN — Medication 1 TABLET(S): at 11:48

## 2025-08-31 RX ADMIN — Medication 600 MILLIGRAM(S): at 01:15

## 2025-08-31 RX ADMIN — Medication 600 MILLIGRAM(S): at 02:15

## 2025-08-31 RX ADMIN — Medication 975 MILLIGRAM(S): at 05:16

## 2025-08-31 RX ADMIN — Medication 975 MILLIGRAM(S): at 09:57

## 2025-08-31 RX ADMIN — MAGNESIUM HYDROXIDE 30 MILLILITER(S): 400 SUSPENSION ORAL at 06:04

## 2025-08-31 RX ADMIN — Medication 975 MILLIGRAM(S): at 09:13

## 2025-08-31 RX ADMIN — Medication 600 MILLIGRAM(S): at 11:48

## 2025-08-31 RX ADMIN — Medication 975 MILLIGRAM(S): at 04:16

## 2025-08-31 RX ADMIN — Medication 600 MILLIGRAM(S): at 07:17

## 2025-08-31 RX ADMIN — Medication 600 MILLIGRAM(S): at 06:47

## (undated) DEVICE — VACUUM CURETTE BUSSE HOSP CURVED 10MM

## (undated) DEVICE — VACUUM CURETTE BUSSE HOSP CURVED 12MM

## (undated) DEVICE — VACUUM CURETTE BUSSE HOSP CURVED 11MM

## (undated) DEVICE — TUBING UTERINE ASPIRATION 3/8" X 6FT W/O ADAPTER

## (undated) DEVICE — VACUUM CURETTE BERKLEY OLYMPUS CURVED 11MM

## (undated) DEVICE — VACUUM CURETTE BERKLEY OLYMPUS CURVED 12MM

## (undated) DEVICE — DRAPE 1/2 SHEET 40X57"

## (undated) DEVICE — PACK LITHOTOMY

## (undated) DEVICE — SOL IRR POUR NS 0.9% 500ML

## (undated) DEVICE — VACUUM CURETTE BERKLEY OLYMPUS CURVED 16MM X 1/2"

## (undated) DEVICE — VACUUM CURETTE MEDGYN CURVED 7MM

## (undated) DEVICE — VACUUM CURETTE BUSSE HOSP CURVED 9MM

## (undated) DEVICE — VACUUM CURETTE MEDGYN CURVED 13MM

## (undated) DEVICE — VACUUM CURETTE BERKLEY OLYMPUS CURVED 8MM

## (undated) DEVICE — WARMING BLANKET UPPER ADULT

## (undated) DEVICE — VACUUM CURETTE BERKLEY OLYMPUS CURVED 7MM

## (undated) DEVICE — VACUUM CURETTE BERKLEY OLYMPUS F TIP 6MM

## (undated) DEVICE — VACUUM CURETTE BUSSE HOSP CURVED 14MM

## (undated) DEVICE — VACUUM CURETTE BUSSE HOSP STRAIGHT 7MM

## (undated) DEVICE — SOCK SPECIMEN 3/8"-1/2" MALE PORT

## (undated) DEVICE — VACUUM CURETTE BERKLEY OLYMPUS CURVED 9MM

## (undated) DEVICE — GLV 7 PROTEXIS (WHITE)

## (undated) DEVICE — PREP BETADINE KIT

## (undated) DEVICE — DRAPE LIGHT HANDLE COVER (GREEN)